# Patient Record
Sex: FEMALE | Race: WHITE | Employment: PART TIME | URBAN - METROPOLITAN AREA
[De-identification: names, ages, dates, MRNs, and addresses within clinical notes are randomized per-mention and may not be internally consistent; named-entity substitution may affect disease eponyms.]

---
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
## 2023-09-26 NOTE — ED TRIAGE NOTES
Left patient a voice mail that rx flovent was changed to pulmicort  RT cost .  [facility]  Instructions given to call our office back if any questions   Pt reports lmp started on 9/8, reports usually having a 5 week cycle. + pregnancy test on 10/16. Pt reports starting having cramps and bleeding on 10/18 and began passing clots on 10/19. Pt endorses cramps in pelvic area and low back.  Endorses nausea, through reports chronic nausea